# Patient Record
Sex: FEMALE | Race: WHITE | HISPANIC OR LATINO
[De-identification: names, ages, dates, MRNs, and addresses within clinical notes are randomized per-mention and may not be internally consistent; named-entity substitution may affect disease eponyms.]

---

## 2018-12-21 ENCOUNTER — APPOINTMENT (OUTPATIENT)
Dept: OBGYN | Facility: CLINIC | Age: 38
End: 2018-12-21
Payer: MEDICAID

## 2018-12-21 VITALS
HEART RATE: 79 BPM | SYSTOLIC BLOOD PRESSURE: 108 MMHG | HEIGHT: 67 IN | DIASTOLIC BLOOD PRESSURE: 74 MMHG | WEIGHT: 237 LBS | BODY MASS INDEX: 37.2 KG/M2

## 2018-12-21 DIAGNOSIS — Z87.891 PERSONAL HISTORY OF NICOTINE DEPENDENCE: ICD-10-CM

## 2018-12-21 DIAGNOSIS — Z80.8 FAMILY HISTORY OF MALIGNANT NEOPLASM OF OTHER ORGANS OR SYSTEMS: ICD-10-CM

## 2018-12-21 DIAGNOSIS — Z87.42 PERSONAL HISTORY OF OTHER DISEASES OF THE FEMALE GENITAL TRACT: ICD-10-CM

## 2018-12-21 DIAGNOSIS — Z86.59 PERSONAL HISTORY OF OTHER MENTAL AND BEHAVIORAL DISORDERS: ICD-10-CM

## 2018-12-21 DIAGNOSIS — N91.2 AMENORRHEA, UNSPECIFIED: ICD-10-CM

## 2018-12-21 PROCEDURE — 99385 PREV VISIT NEW AGE 18-39: CPT

## 2018-12-21 PROCEDURE — 99213 OFFICE O/P EST LOW 20 MIN: CPT | Mod: 25

## 2018-12-21 PROCEDURE — 81025 URINE PREGNANCY TEST: CPT

## 2018-12-21 RX ORDER — ESCITALOPRAM OXALATE 5 MG/1
TABLET, FILM COATED ORAL
Refills: 0 | Status: ACTIVE | COMMUNITY

## 2018-12-30 LAB
CYTOLOGY CVX/VAG DOC THIN PREP: NORMAL
HCG UR QL: NEGATIVE
HPV HIGH+LOW RISK DNA PNL CVX: NOT DETECTED
QUALITY CONTROL: YES

## 2019-01-09 ENCOUNTER — APPOINTMENT (OUTPATIENT)
Dept: OBGYN | Facility: CLINIC | Age: 39
End: 2019-01-09
Payer: MEDICAID

## 2019-01-09 ENCOUNTER — ASOB RESULT (OUTPATIENT)
Age: 39
End: 2019-01-09

## 2019-01-09 VITALS
HEIGHT: 67 IN | WEIGHT: 240 LBS | DIASTOLIC BLOOD PRESSURE: 74 MMHG | BODY MASS INDEX: 37.67 KG/M2 | SYSTOLIC BLOOD PRESSURE: 110 MMHG

## 2019-01-09 LAB
17OHP SERPL-MCNC: 18 NG/DL
CHOLEST SERPL-MCNC: 229 MG/DL
CHOLEST/HDLC SERPL: 3.6 RATIO
ESTRADIOL SERPL-MCNC: 24 PG/ML
FSH SERPL-MCNC: 7.2 IU/L
HCG SERPL-MCNC: <1 MIU/ML
HDLC SERPL-MCNC: 64 MG/DL
INSULIN P FAST SERPL-ACNC: 18.8 UU/ML
LDLC SERPL CALC-MCNC: 146 MG/DL
LH SERPL-ACNC: 5.3 IU/L
PROGEST SERPL-MCNC: 0.1 NG/ML
PROLACTIN SERPL-MCNC: 10.2 NG/ML
SHBG SERPL-SCNC: 22 NMOL/L
TESTOST BND SERPL-MCNC: 0.6 PG/ML
TESTOST SERPL-MCNC: 15.3 NG/DL
TRIGL SERPL-MCNC: 94 MG/DL
TSH SERPL-ACNC: 3.34 UIU/ML

## 2019-01-09 PROCEDURE — 76830 TRANSVAGINAL US NON-OB: CPT

## 2019-01-09 PROCEDURE — 76857 US EXAM PELVIC LIMITED: CPT

## 2019-01-09 PROCEDURE — 99213 OFFICE O/P EST LOW 20 MIN: CPT

## 2019-04-19 ENCOUNTER — APPOINTMENT (OUTPATIENT)
Dept: OBGYN | Facility: CLINIC | Age: 39
End: 2019-04-19
Payer: MEDICAID

## 2019-04-19 VITALS
WEIGHT: 240 LBS | HEIGHT: 67 IN | DIASTOLIC BLOOD PRESSURE: 70 MMHG | BODY MASS INDEX: 37.67 KG/M2 | SYSTOLIC BLOOD PRESSURE: 120 MMHG

## 2019-04-19 PROCEDURE — 99213 OFFICE O/P EST LOW 20 MIN: CPT

## 2019-05-17 ENCOUNTER — RX RENEWAL (OUTPATIENT)
Age: 39
End: 2019-05-17

## 2020-01-08 ENCOUNTER — RX RENEWAL (OUTPATIENT)
Age: 40
End: 2020-01-08

## 2020-01-15 ENCOUNTER — APPOINTMENT (OUTPATIENT)
Dept: OBGYN | Facility: CLINIC | Age: 40
End: 2020-01-15

## 2020-02-05 ENCOUNTER — APPOINTMENT (OUTPATIENT)
Dept: OBGYN | Facility: CLINIC | Age: 40
End: 2020-02-05
Payer: MEDICAID

## 2020-02-05 VITALS
BODY MASS INDEX: 36.73 KG/M2 | HEART RATE: 80 BPM | SYSTOLIC BLOOD PRESSURE: 126 MMHG | WEIGHT: 234 LBS | HEIGHT: 67 IN | DIASTOLIC BLOOD PRESSURE: 83 MMHG

## 2020-02-05 PROCEDURE — 99395 PREV VISIT EST AGE 18-39: CPT

## 2020-02-05 NOTE — PHYSICAL EXAM
[Awake] : awake [Alert] : alert [Acute Distress] : no acute distress [Mass] : no breast mass [Nipple Discharge] : no nipple discharge [Axillary LAD] : no axillary lymphadenopathy [Tender] : non tender [Oriented x3] : oriented to person, place, and time [Soft] : soft [Normal] : uterus [No Bleeding] : there was no active vaginal bleeding [Uterine Adnexae] : were not tender and not enlarged

## 2020-02-12 LAB
CYTOLOGY CVX/VAG DOC THIN PREP: NORMAL
HPV HIGH+LOW RISK DNA PNL CVX: NOT DETECTED

## 2021-02-11 ENCOUNTER — APPOINTMENT (OUTPATIENT)
Dept: OBGYN | Facility: CLINIC | Age: 41
End: 2021-02-11
Payer: MEDICAID

## 2021-02-11 VITALS
WEIGHT: 253 LBS | HEIGHT: 67 IN | BODY MASS INDEX: 39.71 KG/M2 | DIASTOLIC BLOOD PRESSURE: 81 MMHG | SYSTOLIC BLOOD PRESSURE: 131 MMHG

## 2021-02-11 DIAGNOSIS — Z00.00 ENCOUNTER FOR GENERAL ADULT MEDICAL EXAMINATION W/OUT ABNORMAL FINDINGS: ICD-10-CM

## 2021-02-11 DIAGNOSIS — N92.6 IRREGULAR MENSTRUATION, UNSPECIFIED: ICD-10-CM

## 2021-02-11 PROCEDURE — 99395 PREV VISIT EST AGE 18-39: CPT

## 2021-02-11 PROCEDURE — 99072 ADDL SUPL MATRL&STAF TM PHE: CPT

## 2021-02-15 LAB — HPV HIGH+LOW RISK DNA PNL CVX: NOT DETECTED

## 2021-02-25 LAB — CYTOLOGY CVX/VAG DOC THIN PREP: NORMAL

## 2022-03-01 ENCOUNTER — NON-APPOINTMENT (OUTPATIENT)
Age: 42
End: 2022-03-01

## 2022-03-01 ENCOUNTER — APPOINTMENT (OUTPATIENT)
Dept: OBGYN | Facility: CLINIC | Age: 42
End: 2022-03-01
Payer: COMMERCIAL

## 2022-03-01 VITALS
HEIGHT: 67 IN | DIASTOLIC BLOOD PRESSURE: 80 MMHG | BODY MASS INDEX: 40.97 KG/M2 | WEIGHT: 261 LBS | SYSTOLIC BLOOD PRESSURE: 120 MMHG

## 2022-03-01 PROCEDURE — 99396 PREV VISIT EST AGE 40-64: CPT

## 2022-03-01 NOTE — HISTORY OF PRESENT ILLNESS
[FreeTextEntry1] : 42 yo here for av. She has no gyn complaints today. She is doing well on Brand only LoLoestrin 1/10.  She is on Lexapro but is weening off, has not lost the weight yet.  She has never had abnormal paps, has no trouble with bowls.\par \par She denies family h/o gyn or colon cancer. [PGxTotal] : 1 [PGHxAbortions] : 1

## 2022-03-01 NOTE — PLAN
[FreeTextEntry1] : Well woman visit\par \par pap done\par mammogram\par OCP renewed-[facility], I discussed the risk of dvt and emboli  from ocp\par rt in 1 year\par

## 2022-03-01 NOTE — PHYSICAL EXAM
[Chaperone Declined] : Patient declined chaperone [Appropriately responsive] : appropriately responsive [Alert] : alert [No Acute Distress] : no acute distress [No Lymphadenopathy] : no lymphadenopathy [Soft] : soft [Non-tender] : non-tender [Non-distended] : non-distended [No HSM] : No HSM [No Mass] : no mass [No Lesions] : no lesions [Oriented x3] : oriented x3 [Examination Of The Breasts] : a normal appearance [No Masses] : no breast masses were palpable [Labia Majora] : normal [Labia Minora] : normal [Normal] : normal [Uterine Adnexae] : normal

## 2022-03-03 LAB — HPV HIGH+LOW RISK DNA PNL CVX: NOT DETECTED

## 2022-03-07 LAB — CYTOLOGY CVX/VAG DOC THIN PREP: NORMAL

## 2023-04-05 ENCOUNTER — APPOINTMENT (OUTPATIENT)
Dept: OBGYN | Facility: CLINIC | Age: 43
End: 2023-04-05
Payer: COMMERCIAL

## 2023-04-05 VITALS
DIASTOLIC BLOOD PRESSURE: 83 MMHG | SYSTOLIC BLOOD PRESSURE: 124 MMHG | BODY MASS INDEX: 40.81 KG/M2 | HEIGHT: 67 IN | WEIGHT: 260 LBS

## 2023-04-05 DIAGNOSIS — Z01.419 ENCOUNTER FOR GYNECOLOGICAL EXAMINATION (GENERAL) (ROUTINE) W/OUT ABNORMAL FINDINGS: ICD-10-CM

## 2023-04-05 PROCEDURE — 99396 PREV VISIT EST AGE 40-64: CPT

## 2023-04-05 PROCEDURE — 99213 OFFICE O/P EST LOW 20 MIN: CPT | Mod: 25

## 2023-04-05 RX ORDER — NORETHINDRONE ACETATE AND ETHINYL ESTRADIOL, ETHINYL ESTRADIOL AND FERROUS FUMARATE 1MG-10(24)
1 MG-10 MCG / KIT ORAL
Qty: 3 | Refills: 0 | Status: COMPLETED | COMMUNITY
Start: 2019-04-19 | End: 2023-04-05

## 2023-04-05 RX ORDER — NORETHINDRONE ACETATE AND ETHINYL ESTRADIOL, ETHINYL ESTRADIOL AND FERROUS FUMARATE 1MG-10(24)
1 MG-10 MCG / KIT ORAL DAILY
Qty: 3 | Refills: 0 | Status: COMPLETED | COMMUNITY
Start: 2020-02-05 | End: 2023-04-05

## 2023-04-05 RX ORDER — NORETHINDRONE ACETATE AND ETHINYL ESTRADIOL, ETHINYL ESTRADIOL AND FERROUS FUMARATE 1MG-10(24)
1 MG-10 MCG / KIT ORAL
Qty: 1 | Refills: 1 | Status: COMPLETED | COMMUNITY
Start: 2020-04-15 | End: 2023-04-05

## 2023-04-05 RX ORDER — NORETHINDRONE ACETATE AND ETHINYL ESTRADIOL AND FERROUS FUMARATE 1MG-20(21)
1-20 KIT ORAL
Qty: 2 | Refills: 0 | Status: COMPLETED | COMMUNITY
Start: 2019-05-17 | End: 2023-04-05

## 2023-04-05 RX ORDER — NORETHINDRONE ACETATE AND ETHINYL ESTRADIOL, ETHINYL ESTRADIOL AND FERROUS FUMARATE 1MG-10(24)
1 MG-10 MCG / KIT ORAL
Qty: 1 | Refills: 12 | Status: COMPLETED | COMMUNITY
Start: 2019-01-21 | End: 2023-04-05

## 2023-04-05 NOTE — HISTORY OF PRESENT ILLNESS
[FreeTextEntry1] : 42-year-old female presents for well woman exam.  She has no complaints today.  Menarche was at the age of 13.  She has a history of irregular menstrual cycles.  She states typically they last 7 to 10 days.  She also has heavy bleeding and dysmenorrhea.  Patient has a history of PCOS.  She has been taking loloestrin for cycle regulation.  She is happy with the pill and would like to continue, although she did disclose today that she has started smoking again.  She smokes 1 pack of cigarettes approximately every 2 weeks.  She states she is nervous due to the fact that she is over the age of 36, smoking and taking estrogen.  She has a history of fibroids.  She has not had a sonogram in many years to check her fibroids.  She is interested in having a sonogram done.\par \par Past medical history is significant for anxiety and depression.  Past surgical history includes a cyst removal from her neck and a D&C.  Family history is noncontributory.  She is currently smoking 1 pack of cigarettes every 2 weeks.  She socially drinks alcohol.  She denies illicit drugs.  GYN history as above.  OB history: -0-1-0.  History of elective termination of pregnancy with a D&C.  She is allergic to Flagyl.  She is currently taking Lexapro and loloestrin.

## 2023-04-05 NOTE — PLAN
[FreeTextEntry1] : 42-year-old female for well woman exam\par \par 1.  Pap done\par 2.  Rx screening mammogram.  The patient has not yet had a screening mammogram done.  The patient was counseled.\par 3.  History of PCOS and irregular menstrual cycles.  The patient has been taking loloestrin for cycle regulation.  We discussed that as she is over the age of 36 and smoking cigarettes that she should no longer be taking estrogen as it could increase the risk of blood clots.  The patient was counseled on all progesterone only options.  We discussed the minipill, Depo-Provera, Nexplanon, and IUDs.  She is interested in starting the minipill.  The patient was counseled on the minipill.  Instructions were reviewed.  All risk, benefits and potential complications of the minipill were reviewed with the patient.  Rx was provided for 3 months.  She will return to the office in 3 months for a OCP check.  The patient also had questions regarding bilateral salpingectomy.  We discussed that she is a candidate for a bilateral salpingectomy however if her menses are irregular after the salpingectomy she may need to resume something for cycle regulation.  She wishes to move forward with the minipill.\par 4.  History of fibroids.  The patient will return to the office for a transvaginal sonogram to evaluate her fibroids.\par 5.  Annual exam in 1 year

## 2023-04-11 LAB
CYTOLOGY CVX/VAG DOC THIN PREP: NORMAL
HPV HIGH+LOW RISK DNA PNL CVX: NOT DETECTED

## 2023-05-09 ENCOUNTER — APPOINTMENT (OUTPATIENT)
Dept: OBGYN | Facility: CLINIC | Age: 43
End: 2023-05-09
Payer: COMMERCIAL

## 2023-05-09 ENCOUNTER — APPOINTMENT (OUTPATIENT)
Dept: ANTEPARTUM | Facility: CLINIC | Age: 43
End: 2023-05-09
Payer: COMMERCIAL

## 2023-05-09 ENCOUNTER — ASOB RESULT (OUTPATIENT)
Age: 43
End: 2023-05-09

## 2023-05-09 VITALS
WEIGHT: 263 LBS | DIASTOLIC BLOOD PRESSURE: 82 MMHG | SYSTOLIC BLOOD PRESSURE: 120 MMHG | HEIGHT: 67 IN | BODY MASS INDEX: 41.28 KG/M2

## 2023-05-09 DIAGNOSIS — N94.6 DYSMENORRHEA, UNSPECIFIED: ICD-10-CM

## 2023-05-09 DIAGNOSIS — E28.2 POLYCYSTIC OVARIAN SYNDROME: ICD-10-CM

## 2023-05-09 DIAGNOSIS — D21.9 BENIGN NEOPLASM OF CONNECTIVE AND OTHER SOFT TISSUE, UNSPECIFIED: ICD-10-CM

## 2023-05-09 PROCEDURE — 76830 TRANSVAGINAL US NON-OB: CPT

## 2023-05-09 PROCEDURE — 76856 US EXAM PELVIC COMPLETE: CPT | Mod: 59

## 2023-05-09 PROCEDURE — 99213 OFFICE O/P EST LOW 20 MIN: CPT | Mod: 25

## 2023-05-09 NOTE — HISTORY OF PRESENT ILLNESS
[FreeTextEntry1] : 42-year-old female presents for sono results and OCP check.  She has no complaints today.  Menarche was at the age of 13.  She has a history of irregular menstrual cycles.  She states typically they last 7 to 10 days.  She also has heavy bleeding and dysmenorrhea.  Patient has a history of PCOS.  She had been taking loloestrin for cycle regulation.  She was happy with the pill but did disclose at her last visit that she had started smoking again.  She smokes 1 pack of cigarettes approximately every 2 weeks.  She started the mini pill and is happy with it.  She would like to continue.  She has a history of fibroids.  She is here to review her sono results.\par \par Past medical history is significant for anxiety and depression.  Past surgical history includes a cyst removal from her neck and a D&C.  Family history is noncontributory.  She is currently smoking 1 pack of cigarettes every 2 weeks.  She socially drinks alcohol.  She denies illicit drugs.  GYN history as above.  OB history: -0-1-0.  History of elective termination of pregnancy with a D&C.  She is allergic to Flagyl.  She is currently taking Lexapro and the minipill.

## 2023-05-09 NOTE — PLAN
[FreeTextEntry1] : 42-year-old female for\par \par 1.  History of PCOS, irregular menstrual cycles, over the age of 36 and smoking cigarettes.  The patient was changed at her last visit from loloestrin to the minipill.  She has been on the minipill for 1 month.  She is happy on the minipill and would like to continue.  Rx was provided until her next annual in April 2024.  All risk, benefits and potential complications of the minipill reviewed with the patient.\par \par 2.  History of fibroids.  Sonogram was reviewed with the patient today.  She has a 3.5 cm subserosal fundal fibroid.  The patient was counseled on fibroids.  She is aware that they can cause heavy menstrual bleeding, dysmenorrhea, and pelvic pain/pressure.  She is currently asymptomatic.  Plan to repeat the sonogram in 1 year to reassess her fibroid.\par \par The patient was given the opportunity to ask questions and all were answered to her satisfaction.\par

## 2024-03-28 RX ORDER — NORETHINDRONE 0.35 MG/1
0.35 TABLET ORAL
Qty: 3 | Refills: 0 | Status: ACTIVE | COMMUNITY
Start: 2023-04-05

## 2024-06-17 ENCOUNTER — APPOINTMENT (OUTPATIENT)
Dept: OBGYN | Facility: CLINIC | Age: 44
End: 2024-06-17

## 2024-07-09 ENCOUNTER — APPOINTMENT (OUTPATIENT)
Dept: OBGYN | Facility: CLINIC | Age: 44
End: 2024-07-09

## 2024-07-29 ENCOUNTER — APPOINTMENT (OUTPATIENT)
Dept: OBGYN | Facility: CLINIC | Age: 44
End: 2024-07-29
Payer: COMMERCIAL

## 2024-07-29 VITALS
BODY MASS INDEX: 40.81 KG/M2 | WEIGHT: 260 LBS | HEIGHT: 67 IN | SYSTOLIC BLOOD PRESSURE: 121 MMHG | DIASTOLIC BLOOD PRESSURE: 80 MMHG

## 2024-07-29 DIAGNOSIS — Z30.09 ENCOUNTER FOR OTHER GENERAL COUNSELING AND ADVICE ON CONTRACEPTION: ICD-10-CM

## 2024-07-29 PROCEDURE — 99459 PELVIC EXAMINATION: CPT

## 2024-07-29 PROCEDURE — 99213 OFFICE O/P EST LOW 20 MIN: CPT

## 2024-07-29 NOTE — HISTORY OF PRESENT ILLNESS
[FreeTextEntry1] : 44-year-old white female para 0 presents as a new patient to this practice.  She is here to discuss birth control options.  Is currently menstruating and declines pelvic exam.    Patient has some history of being diagnosed with PCOS although I do see that her LH FSH ratio was normal in 2018.  She was on low Loestrin but the contraceptive was stopped secondary to her smoking.  Patient says she has stopped smoking now for over 2 months and plans never to resume smoking.  She has a past medical history of anxiety and depression's surgical history significant for D&C and a cyst removal from her neck.  She is single.  She denies tobacco currently no drug use drinks occasionally.  OB history significant for 1 termination.  Patient is a .  She denies any family history of breast, ovarian, colon, uterine CA.  she has never had a mammogram

## 2024-07-29 NOTE — PHYSICAL EXAM
[Chaperone Present] : A chaperone was present in the examining room during all aspects of the physical examination [[id number]] : A chaperone was present during the pelvic exam. [FreeTextEntry2] : kristen diaz [Appropriately responsive] : appropriately responsive [Alert] : alert [No Acute Distress] : no acute distress [No Lymphadenopathy] : no lymphadenopathy [Regular Rate Rhythm] : regular rate rhythm [No Murmurs] : no murmurs [Clear to Auscultation B/L] : clear to auscultation bilaterally [Soft] : soft [Non-tender] : non-tender [Non-distended] : non-distended [No HSM] : No HSM [No Lesions] : no lesions [No Mass] : no mass [Oriented x3] : oriented x3 [Examination Of The Breasts] : a normal appearance [Normal] : normal [No Masses] : no breast masses were palpable

## 2024-07-29 NOTE — DISCUSSION/SUMMARY
[FreeTextEntry1] : I discussed her birth control options at length.  Patient does not want to have children and is certain about this.  We discussed considering an IUD but patient advised me that she has had an IUD in the past which she did not tolerate well.  She wishes to go back on low Loestrin.  I discussed risk including elevated risk of DVT breakthrough bleeding MI and stroke.  After all questions were answered I am restarting hormonal estrogen.  Prior to going back on the pill she will go for blood work regarding PCOS including LH FSH, hemoglobin A1c.  TSH.    Patient will return for annual visit as well as pelvic ultrasound.  She has a known small fibroid uterus.

## 2024-07-30 ENCOUNTER — ASOB RESULT (OUTPATIENT)
Age: 44
End: 2024-07-30

## 2024-07-30 ENCOUNTER — TRANSCRIPTION ENCOUNTER (OUTPATIENT)
Age: 44
End: 2024-07-30

## 2024-07-30 ENCOUNTER — APPOINTMENT (OUTPATIENT)
Dept: ANTEPARTUM | Facility: CLINIC | Age: 44
End: 2024-07-30
Payer: COMMERCIAL

## 2024-07-30 PROCEDURE — 76856 US EXAM PELVIC COMPLETE: CPT | Mod: 59

## 2024-07-30 PROCEDURE — 76830 TRANSVAGINAL US NON-OB: CPT

## 2024-07-30 RX ORDER — NORETHINDRONE ACETATE AND ETHINYL ESTRADIOL, ETHINYL ESTRADIOL AND FERROUS FUMARATE 1MG-10(24)
1 MG-10 MCG / KIT ORAL DAILY
Qty: 3 | Refills: 1 | Status: ACTIVE | COMMUNITY
Start: 2024-07-30 | End: 1900-01-01

## 2024-07-31 LAB
24R-OH-CALCIDIOL SERPL-MCNC: 53.8 PG/ML
ALBUMIN SERPL ELPH-MCNC: 4.3 G/DL
ALP BLD-CCNC: 67 U/L
ALT SERPL-CCNC: 22 U/L
ANION GAP SERPL CALC-SCNC: 10 MMOL/L
AST SERPL-CCNC: 18 U/L
BILIRUB SERPL-MCNC: 0.5 MG/DL
BUN SERPL-MCNC: 16 MG/DL
CALCIUM SERPL-MCNC: 9.5 MG/DL
CHLORIDE SERPL-SCNC: 101 MMOL/L
CHOLEST SERPL-MCNC: 238 MG/DL
CO2 SERPL-SCNC: 23 MMOL/L
CREAT SERPL-MCNC: 0.85 MG/DL
EGFR: 87 ML/MIN/1.73M2
ESTIMATED AVERAGE GLUCOSE: 108 MG/DL
FSH SERPL-MCNC: 8.2 IU/L
GLUCOSE SERPL-MCNC: 106 MG/DL
HBA1C MFR BLD HPLC: 5.4 %
HCT VFR BLD CALC: 39.6 %
HDLC SERPL-MCNC: 50 MG/DL
HGB BLD-MCNC: 13.2 G/DL
LDLC SERPL CALC-MCNC: 164 MG/DL
LH SERPL-ACNC: 7.2 IU/L
MCHC RBC-ENTMCNC: 31.3 PG
MCHC RBC-ENTMCNC: 33.3 GM/DL
MCV RBC AUTO: 93.8 FL
NONHDLC SERPL-MCNC: 188 MG/DL
PLATELET # BLD AUTO: 261 K/UL
POTASSIUM SERPL-SCNC: 4.4 MMOL/L
PROT SERPL-MCNC: 6.9 G/DL
RBC # BLD: 4.22 M/UL
RBC # FLD: 13.5 %
SODIUM SERPL-SCNC: 135 MMOL/L
TRIGL SERPL-MCNC: 136 MG/DL
TSH SERPL-ACNC: 3.92 UIU/ML
WBC # FLD AUTO: 6.4 K/UL

## 2024-08-01 ENCOUNTER — APPOINTMENT (OUTPATIENT)
Dept: OBGYN | Facility: CLINIC | Age: 44
End: 2024-08-01
Payer: COMMERCIAL

## 2024-08-01 VITALS
WEIGHT: 260 LBS | HEIGHT: 67 IN | HEART RATE: 78 BPM | BODY MASS INDEX: 40.81 KG/M2 | SYSTOLIC BLOOD PRESSURE: 110 MMHG | DIASTOLIC BLOOD PRESSURE: 73 MMHG

## 2024-08-01 DIAGNOSIS — Z01.419 ENCOUNTER FOR GYNECOLOGICAL EXAMINATION (GENERAL) (ROUTINE) W/OUT ABNORMAL FINDINGS: ICD-10-CM

## 2024-08-01 DIAGNOSIS — D21.9 BENIGN NEOPLASM OF CONNECTIVE AND OTHER SOFT TISSUE, UNSPECIFIED: ICD-10-CM

## 2024-08-01 DIAGNOSIS — Z12.39 ENCOUNTER FOR OTHER SCREENING FOR MALIGNANT NEOPLASM OF BREAST: ICD-10-CM

## 2024-08-01 PROCEDURE — 99459 PELVIC EXAMINATION: CPT

## 2024-08-01 PROCEDURE — 99396 PREV VISIT EST AGE 40-64: CPT

## 2024-08-01 NOTE — HISTORY OF PRESENT ILLNESS
[FreeTextEntry1] : 44-year-old white female para 0 presents for annual visit. Patient is here for well visit as well as to discuss her sonogram and blood work results.  Patient gives history of PCOS.  Ultrasound showed normal LH and FSH.  Normal TSH.  Her total cholesterol and LDL were elevated.  She also had normal hemoglobin A1c.  Ultrasound revealed 8.2 x 4.8 cm uterus with 2 myomas 1 of which is 3.4 cm and subserosal and the other 1 is 1.7 and intramural.  Both adnexa are normal.  Patient is sexually active with her  and had been taken off oral contraceptives because of smoking but she has quit smoking for several months and I have renewed her oral contraceptives.  She has a past medical history of anxiety and depression's surgical history significant for D&C and a cyst removal from her neck.  She is single.  She denies tobacco currently no drug use drinks occasionally.  OB history significant for 1 termination.  Patient is a .  She denies any family history of breast, ovarian, colon, uterine CA.  she has never had a mammogram

## 2024-08-01 NOTE — PHYSICAL EXAM
[Chaperone Present] : A chaperone was present in the examining room during all aspects of the physical examination [[id number]] : A chaperone was present during the pelvic exam. [FreeTextEntry2] : kristen diaz [Appropriately responsive] : appropriately responsive [Alert] : alert [No Acute Distress] : no acute distress [No Lymphadenopathy] : no lymphadenopathy [Regular Rate Rhythm] : regular rate rhythm [No Murmurs] : no murmurs [Clear to Auscultation B/L] : clear to auscultation bilaterally [Soft] : soft [Non-tender] : non-tender [Non-distended] : non-distended [No HSM] : No HSM [No Lesions] : no lesions [No Mass] : no mass [Oriented x3] : oriented x3 [Examination Of The Breasts] : a normal appearance [No Masses] : no breast masses were palpable [Labia Majora] : normal [Labia Minora] : normal [Normal] : normal [Uterine Adnexae] : normal

## 2024-08-01 NOTE — DISCUSSION/SUMMARY
[FreeTextEntry1] : Pap smear is performed.  Prescription for mammogram was provided and I stressed considering the mammogram.  Regarding fibroids I had a long discussion.  I discussed high incidence of fibroids and extremely low likelihood of malignancy.  I discussed using pictorial aids different locations of fibroids and the implications to her.  She says her periods are very good on oral contraceptives.  I discussed also considering an IUD as she is not considering a child.  Pros and cons were discussed and information was provided.  She had a previous bad experience with the ParaGard and have encouraged her to consider a hormonal IUD..

## 2024-08-05 LAB — HPV HIGH+LOW RISK DNA PNL CVX: NOT DETECTED

## 2024-08-08 LAB — CYTOLOGY CVX/VAG DOC THIN PREP: NORMAL

## 2024-09-19 ENCOUNTER — APPOINTMENT (OUTPATIENT)
Age: 44
End: 2024-09-19

## 2024-12-12 ENCOUNTER — APPOINTMENT (OUTPATIENT)
Dept: MAMMOGRAPHY | Facility: CLINIC | Age: 44
End: 2024-12-12

## 2025-02-12 ENCOUNTER — APPOINTMENT (OUTPATIENT)
Dept: OBGYN | Facility: CLINIC | Age: 45
End: 2025-02-12